# Patient Record
Sex: FEMALE | Race: BLACK OR AFRICAN AMERICAN | NOT HISPANIC OR LATINO | Employment: UNEMPLOYED | URBAN - METROPOLITAN AREA
[De-identification: names, ages, dates, MRNs, and addresses within clinical notes are randomized per-mention and may not be internally consistent; named-entity substitution may affect disease eponyms.]

---

## 2023-11-11 ENCOUNTER — APPOINTMENT (OUTPATIENT)
Dept: GENERAL RADIOLOGY | Facility: CLINIC | Age: 73
End: 2023-11-11
Attending: PHYSICIAN ASSISTANT
Payer: COMMERCIAL

## 2023-11-11 ENCOUNTER — HOSPITAL ENCOUNTER (EMERGENCY)
Facility: CLINIC | Age: 73
Discharge: HOME | End: 2023-11-11
Attending: EMERGENCY MEDICINE | Admitting: EMERGENCY MEDICINE
Payer: COMMERCIAL

## 2023-11-11 VITALS
HEART RATE: 68 BPM | RESPIRATION RATE: 18 BRPM | SYSTOLIC BLOOD PRESSURE: 126 MMHG | OXYGEN SATURATION: 100 % | DIASTOLIC BLOOD PRESSURE: 65 MMHG | TEMPERATURE: 97.9 F

## 2023-11-11 DIAGNOSIS — R09.89 THROAT TIGHTNESS: ICD-10-CM

## 2023-11-11 DIAGNOSIS — K21.9 GASTROESOPHAGEAL REFLUX DISEASE, UNSPECIFIED WHETHER ESOPHAGITIS PRESENT: ICD-10-CM

## 2023-11-11 DIAGNOSIS — S60.011A CONTUSION OF RIGHT THUMB WITHOUT DAMAGE TO NAIL, INITIAL ENCOUNTER: ICD-10-CM

## 2023-11-11 LAB
ALBUMIN SERPL BCG-MCNC: 4 G/DL (ref 3.5–5.2)
ALP SERPL-CCNC: 91 U/L (ref 35–104)
ALT SERPL W P-5'-P-CCNC: 11 U/L (ref 0–50)
ANION GAP SERPL CALCULATED.3IONS-SCNC: 5 MMOL/L (ref 7–15)
AST SERPL W P-5'-P-CCNC: 17 U/L (ref 0–45)
BASOPHILS # BLD AUTO: 0 10E3/UL (ref 0–0.2)
BASOPHILS NFR BLD AUTO: 0 %
BILIRUB SERPL-MCNC: 0.9 MG/DL
BUN SERPL-MCNC: 13.3 MG/DL (ref 8–23)
CALCIUM SERPL-MCNC: 9.6 MG/DL (ref 8.8–10.2)
CHLORIDE SERPL-SCNC: 104 MMOL/L (ref 98–107)
CREAT SERPL-MCNC: 0.87 MG/DL (ref 0.51–0.95)
DEPRECATED HCO3 PLAS-SCNC: 27 MMOL/L (ref 22–29)
EGFRCR SERPLBLD CKD-EPI 2021: 71 ML/MIN/1.73M2
EOSINOPHIL # BLD AUTO: 0.1 10E3/UL (ref 0–0.7)
EOSINOPHIL NFR BLD AUTO: 2 %
ERYTHROCYTE [DISTWIDTH] IN BLOOD BY AUTOMATED COUNT: 12 % (ref 10–15)
GLUCOSE SERPL-MCNC: 105 MG/DL (ref 70–99)
HCT VFR BLD AUTO: 41.6 % (ref 35–47)
HGB BLD-MCNC: 13.5 G/DL (ref 11.7–15.7)
IMM GRANULOCYTES # BLD: 0 10E3/UL
IMM GRANULOCYTES NFR BLD: 0 %
LYMPHOCYTES # BLD AUTO: 1.5 10E3/UL (ref 0.8–5.3)
LYMPHOCYTES NFR BLD AUTO: 29 %
MCH RBC QN AUTO: 28.3 PG (ref 26.5–33)
MCHC RBC AUTO-ENTMCNC: 32.5 G/DL (ref 31.5–36.5)
MCV RBC AUTO: 87 FL (ref 78–100)
MONOCYTES # BLD AUTO: 0.4 10E3/UL (ref 0–1.3)
MONOCYTES NFR BLD AUTO: 8 %
NEUTROPHILS # BLD AUTO: 3 10E3/UL (ref 1.6–8.3)
NEUTROPHILS NFR BLD AUTO: 61 %
NRBC # BLD AUTO: 0 10E3/UL
NRBC BLD AUTO-RTO: 0 /100
PLATELET # BLD AUTO: 228 10E3/UL (ref 150–450)
POTASSIUM SERPL-SCNC: 3.7 MMOL/L (ref 3.4–5.3)
PROT SERPL-MCNC: 6.8 G/DL (ref 6.4–8.3)
RBC # BLD AUTO: 4.77 10E6/UL (ref 3.8–5.2)
SODIUM SERPL-SCNC: 136 MMOL/L (ref 135–145)
TROPONIN T SERPL HS-MCNC: <6 NG/L
TSH SERPL DL<=0.005 MIU/L-ACNC: 1.01 UIU/ML (ref 0.3–4.2)
WBC # BLD AUTO: 5.1 10E3/UL (ref 4–11)

## 2023-11-11 PROCEDURE — 99285 EMERGENCY DEPT VISIT HI MDM: CPT | Mod: 25 | Performed by: EMERGENCY MEDICINE

## 2023-11-11 PROCEDURE — 80053 COMPREHEN METABOLIC PANEL: CPT | Performed by: PHYSICIAN ASSISTANT

## 2023-11-11 PROCEDURE — 99284 EMERGENCY DEPT VISIT MOD MDM: CPT | Mod: 25 | Performed by: EMERGENCY MEDICINE

## 2023-11-11 PROCEDURE — 73140 X-RAY EXAM OF FINGER(S): CPT | Mod: RT

## 2023-11-11 PROCEDURE — 93010 ELECTROCARDIOGRAM REPORT: CPT | Performed by: EMERGENCY MEDICINE

## 2023-11-11 PROCEDURE — 84484 ASSAY OF TROPONIN QUANT: CPT | Performed by: PHYSICIAN ASSISTANT

## 2023-11-11 PROCEDURE — 93005 ELECTROCARDIOGRAM TRACING: CPT | Performed by: EMERGENCY MEDICINE

## 2023-11-11 PROCEDURE — 250N000013 HC RX MED GY IP 250 OP 250 PS 637: Performed by: PHYSICIAN ASSISTANT

## 2023-11-11 PROCEDURE — 71046 X-RAY EXAM CHEST 2 VIEWS: CPT

## 2023-11-11 PROCEDURE — 85025 COMPLETE CBC W/AUTO DIFF WBC: CPT | Performed by: PHYSICIAN ASSISTANT

## 2023-11-11 PROCEDURE — 84443 ASSAY THYROID STIM HORMONE: CPT | Performed by: PHYSICIAN ASSISTANT

## 2023-11-11 PROCEDURE — 36415 COLL VENOUS BLD VENIPUNCTURE: CPT | Performed by: PHYSICIAN ASSISTANT

## 2023-11-11 RX ORDER — MAGNESIUM HYDROXIDE/ALUMINUM HYDROXICE/SIMETHICONE 120; 1200; 1200 MG/30ML; MG/30ML; MG/30ML
15 SUSPENSION ORAL ONCE
Status: COMPLETED | OUTPATIENT
Start: 2023-11-11 | End: 2023-11-11

## 2023-11-11 RX ORDER — SUCRALFATE ORAL 1 G/10ML
1 SUSPENSION ORAL 4 TIMES DAILY
Qty: 414 ML | Refills: 0 | Status: SHIPPED | OUTPATIENT
Start: 2023-11-11

## 2023-11-11 RX ORDER — ACETAMINOPHEN 500 MG
1000 TABLET ORAL ONCE
Status: COMPLETED | OUTPATIENT
Start: 2023-11-11 | End: 2023-11-11

## 2023-11-11 RX ADMIN — ACETAMINOPHEN 1000 MG: 500 TABLET ORAL at 21:08

## 2023-11-11 RX ADMIN — ALUMINUM HYDROXIDE, MAGNESIUM HYDROXIDE, AND SIMETHICONE 15 ML: 200; 200; 20 SUSPENSION ORAL at 21:08

## 2023-11-11 ASSESSMENT — ACTIVITIES OF DAILY LIVING (ADL): ADLS_ACUITY_SCORE: 35

## 2023-11-12 LAB
ATRIAL RATE - MUSE: 69 BPM
DIASTOLIC BLOOD PRESSURE - MUSE: NORMAL MMHG
INTERPRETATION ECG - MUSE: NORMAL
P AXIS - MUSE: 4 DEGREES
PR INTERVAL - MUSE: 166 MS
QRS DURATION - MUSE: 76 MS
QT - MUSE: 420 MS
QTC - MUSE: 450 MS
R AXIS - MUSE: 20 DEGREES
SYSTOLIC BLOOD PRESSURE - MUSE: NORMAL MMHG
T AXIS - MUSE: 36 DEGREES
VENTRICULAR RATE- MUSE: 69 BPM

## 2023-11-12 NOTE — ED PROVIDER NOTES
"ED Provider Note  [facility]      History     Chief Complaint   Patient presents with    Chest Pain     Patient has family member that is interpreting for her (declines medication), she states that she has been taking her heartburn medication, but that the pain is getting worse. Feels tighning in her chest and throat, unclear how long, family member just keeps stating, \"awhile.\"    Patient also has a right thumb injury, slammed it in a car door upon arrival to the ED.      Hand Pain     Right thumb.      HPI  Alex GUTIERREZ Apolinar is a 73 year old female past medical history significant for GERD, H. pylori infection, chronic sinusitis chronic constipation dysphagia, presents the emergency department tonight with her 2 daughters with concerns for neck discomfort.  History obtained through daughters, [place]  offered and declined.    Patient states that she is here today for 2 main concerns, neck discomfort as well as right thumb injury.    Daughter states over the last 8 months patient been dealing with ongoing anterior neck discomfort.  This seems to bother her when she swallows and gives her a pressure type sensation in her neck.  This does not influence with eating.  There is no associated fevers cough runny nose or sore throat with her symptoms.  Patient initially had some chest discomfort when her symptoms started several months ago which has not resolved she does not have any chest pain shortness of breath nausea vomiting diarrhea at this time.  She will be constipation from time to time and does use stool softeners for this.  Patient has been eating and drinking at home.    Patient has been seen several times for dysphagia and neck discomfort including recent visit to the [facility] emergency [id number].  At that time she had reassuring work-up including CT scanning of the neck with contrast showing no acute findings, unchanged multilevel cervical spondylosis, faint " suggestion of focal nodularity in the inferior right parotid gland thought to be normal heterogeneous enhancement of the gland without suspicious adenopathy.  Patient was discharged with recommendations of following up with GI including swallow study.    Patient states she did recently see [facility] in the interim and was put on a new PPI.  She is not currently currently on famotidine.  She has not yet seen anyone for a swallow study.  She is not having any dental pain with her symptoms currently.  In addition patient notes prior to coming into the emergency room she slammed her right thumb into a car door just prior to presentation.    She localizes pain to the distal aspect of the right thumb without any other pain from her injury into the right hand remaining digits.  She reports some numbness here.  Per EMR patient does not recall last tetanus shot, I do not see prior tetanus in the medical record.  Patient is right-hand dominant.    Past Medical History  No past medical history on file.  No past surgical history on file.  sucralfate (CARAFATE) 1 GM/10ML suspension      Allergies   Allergen Reactions    Oxymetazoline Other (See Comments)     Family History  No family history on file.  Social History          A medically appropriate review of systems was performed with pertinent positives and negatives noted in the HPI, and all other systems negative.    Physical Exam   BP: 126/65  Pulse: 68  Temp: 97.9  F (36.6  C)  Resp: 18  SpO2: 100 %  Physical Exam    GENERAL APPEARANCE: The patient is well developed, well appearing, and in no acute distress.  HEAD:  Normocephalic and atraumatic.   EENT: Voice normal.  UV midline with no exits.  No drooling noted.  No muffled voice.  No tenderness to palpation of the teeth throughout.  Patient has no submandibular swelling noted on exam no tenderness here noted.  No TM erythema or bulging bilaterally.  NECK: Trachea is midline.No lymphadenopathy or tenderness.  Patient is  able to fully laterally bend the neck to each side without discomfort.  No neck swelling noted.  LUNGS: Breath sounds are equal and clear bilaterally. No wheezes, rhonchi, or rales.  HEART: Regular rate and normal rhythm.    ABDOMEN: Soft, flat, and benign. No mass, tenderness, guarding, or rebound.Bowel sounds are present.  EXTREMITIES: No cyanosis, clubbing, or edema.  Examination of the right thumb shows superficial abrasion to the pad of the right thumb.  No visible laceration requiring closure.  No visible foreign body bone tendon seen.  No involvement of the nail.  Patient has tenderness to palpation to the distal aspect of the thumb without significant tenderness noted to palpation of the IP first MCP, right wrist snuffbox or any other digits of the right hand.  NEUROLOGIC: No focal sensory or motor deficits are noted.  Gross sensation intact distal aspect right thumb.  Peripheral vascular: Radial pulse 2+ on the right.  Cap refill less than 2 seconds right thumb.  PSYCHIATRIC: The patient is awake, alert.  Appropriate mood and affect.  SKIN: Warm, dry, and well perfused. Good turgor.    ED Course, Procedures, & Data        EKG 11/11/2023:  Normal sinus rhythm, ventricular rate 69 with QTc 452.  On my interpretation there is low voltage present.  There appears to be a P wave before every QRS complex.  I do not see any visible ST elevation or depression to suggest ischemia.  No prior for comparison.     Results for orders placed or performed during the hospital encounter of 11/11/23   XR Finger Right G/E 2 Views     Status: None    Narrative    EXAM: XR FINGER RIGHT G/E 2 VIEWS  LOCATION: [facility]  DATE: 11/11/2023    INDICATION: Right thumb crush injury. Pain. Evaluate for tuft fracture.  COMPARISON: None.      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 Otherwise negative. No  fracture.     Chest XR,  PA & LAT     Status: None    Narrative    EXAM: XR CHEST 2 VIEWS  LOCATION: [facility]  DATE: 11/11/2023    INDICATION: Neck discomfort, r o free air or other etiology  COMPARISON: None.      Impression    IMPRESSION: No acute cardiopulmonary disease. Hypoinflated lungs.   Comprehensive metabolic panel     Status: Abnormal   Result Value Ref Range    Sodium 136 135 - 145 mmol/L    Potassium 3.7 3.4 - 5.3 mmol/L    Carbon Dioxide (CO2) 27 22 - 29 mmol/L    Anion Gap 5 (L) 7 - 15 mmol/L    Urea Nitrogen 13.3 8.0 - 23.0 mg/dL    Creatinine 0.87 0.51 - 0.95 mg/dL    GFR Estimate 71 >60 mL/min/1.73m2    Calcium 9.6 8.8 - 10.2 mg/dL    Chloride 104 98 - 107 mmol/L    Glucose 105 (H) 70 - 99 mg/dL    Alkaline Phosphatase 91 35 - 104 U/L    AST 17 0 - 45 U/L    ALT 11 0 - 50 U/L    Protein Total 6.8 6.4 - 8.3 g/dL    Albumin 4.0 3.5 - 5.2 g/dL    Bilirubin Total 0.9 <=1.2 mg/dL   Troponin T, High Sensitivity     Status: Normal   Result Value Ref Range    Troponin T, High Sensitivity <6 <=14 ng/L   TSH with free T4 reflex     Status: Normal   Result Value Ref Range    TSH 1.01 0.30 - 4.20 uIU/mL   CBC with platelets and differential     Status: None   Result Value Ref Range    WBC Count 5.1 4.0 - 11.0 10e3/uL    RBC Count 4.77 3.80 - 5.20 10e6/uL    Hemoglobin 13.5 11.7 - 15.7 g/dL    Hematocrit 41.6 35.0 - 47.0 %    MCV 87 78 - 100 fL    MCH 28.3 26.5 - 33.0 pg    MCHC 32.5 31.5 - 36.5 g/dL    RDW 12.0 10.0 - 15.0 %    Platelet Count 228 150 - 450 10e3/uL    % Neutrophils 61 %    % Lymphocytes 29 %    % Monocytes 8 %    % Eosinophils 2 %    % Basophils 0 %    % Immature Granulocytes 0 %    NRBCs per 100 WBC 0 <1 /100    Absolute Neutrophils 3.0 1.6 - 8.3 10e3/uL    Absolute Lymphocytes 1.5 0.8 - 5.3 10e3/uL    Absolute Monocytes 0.4 0.0 - 1.3 10e3/uL    Absolute Eosinophils 0.1 0.0 - 0.7 10e3/uL    Absolute Basophils 0.0 0.0 - 0.2 10e3/uL    Absolute  Immature Granulocytes 0.0 <=0.4 10e3/uL    Absolute NRBCs 0.0 10e3/uL   EKG 12 lead     Status: None (Preliminary result)   Result Value Ref Range    Systolic Blood Pressure  mmHg    Diastolic Blood Pressure  mmHg    Ventricular Rate 69 BPM    Atrial Rate 69 BPM    IA Interval 166 ms    QRS Duration 76 ms     ms    QTc 450 ms    P Axis 4 degrees    R AXIS 20 degrees    T Axis 36 degrees    Interpretation ECG       Sinus rhythm  Septal infarct , age undetermined  Abnormal ECG     CBC with platelets differential     Status: None    Narrative    The following orders were created for panel order CBC with platelets differential.  Procedure                               Abnormality         Status                     ---------                               -----------         ------                     CBC with platelets and d...[[id number]]                      Final result                 Please view results for these tests on the individual orders.     Medications   alum & mag hydroxide-simethicone (MAALOX) suspension 15 mL (15 mLs Oral $Given 11/11/23 2108)   Tdap (tetanus-diphtheria-acell pertussis) (ADACEL) injection 0.5 mL (0.5 mLs Intramuscular Not Given 11/11/23 2112)   acetaminophen (TYLENOL) tablet 1,000 mg (1,000 mg Oral $Given 11/11/23 2108)     Labs Ordered and Resulted from Time of ED Arrival to Time of ED Departure   COMPREHENSIVE METABOLIC PANEL - Abnormal       Result Value    Sodium 136      Potassium 3.7      Carbon Dioxide (CO2) 27      Anion Gap 5 (*)     Urea Nitrogen 13.3      Creatinine 0.87      GFR Estimate 71      Calcium 9.6      Chloride 104      Glucose 105 (*)     Alkaline Phosphatase 91      AST 17      ALT 11      Protein Total 6.8      Albumin 4.0      Bilirubin Total 0.9     TROPONIN T, HIGH SENSITIVITY - Normal    Troponin T, High Sensitivity <6     TSH WITH FREE T4 REFLEX - Normal    TSH 1.01     CBC WITH PLATELETS AND DIFFERENTIAL    WBC Count 5.1      RBC Count 4.77       Hemoglobin 13.5      Hematocrit 41.6      MCV 87      MCH 28.3      MCHC 32.5      RDW 12.0      Platelet Count 228      % Neutrophils 61      % Lymphocytes 29      % Monocytes 8      % Eosinophils 2      % Basophils 0      % Immature Granulocytes 0      NRBCs per 100 WBC 0      Absolute Neutrophils 3.0      Absolute Lymphocytes 1.5      Absolute Monocytes 0.4      Absolute Eosinophils 0.1      Absolute Basophils 0.0      Absolute Immature Granulocytes 0.0      Absolute NRBCs 0.0       Chest XR,  PA & LAT   Final Result   IMPRESSION: No acute cardiopulmonary disease. Hypoinflated lungs.      XR Finger Right G/E 2 Views   Final Result   IMPRESSION: Mild osteoarthrosis of the first CMC joint, first MCP joint and the interphalangeal joint of the thumb. Otherwise negative. No fracture.                Critical care was not performed.     Medical Decision Making  The patient's presentation was of high complexity (a chronic illness severe exacerbation, progression, or side effect of treatment).    The patient's evaluation involved:  ordering and/or review of 3+ test(s) in this encounter (see separate area of note for details)  independent interpretation of testing performed by another health professional (CXR)    The patient's management necessitated moderate risk (prescription drug management including medications given in the ED).    Assessment & Plan    This is a 73-year-old female with known history of dysphagia present with concerns for ongoing dysphagia symptoms over the last 8 months in the absence of any new symptoms, recent negative CT neck 1 month ago.  On presentation to department vital signs within normal range.  Examination of the neck shows no obvious swelling, drooling, change in voice.  Patient has no visible otitis media to explain neck discomfort, she has reassuring tonsils on exam without exudates, swelling, drooling, or other emergent findings suggestive of PTA RPA or deep space infection.  She is not  having any posterior neck pain to suggest cervical etiology.  Duration of symptoms not consistent with carotid dissection or other emergent vascular process.  Patient not having any chest pain or shortness of breath here.  Initial differential considered includes possibility of motility issue, GERD, amongst others.  Low suspicion of deep space infection with recent negative CT and clinical findings here.  Will repeat troponin EKG, TSH will be obtained as patient has not had this since symptom onset in addition to screening labs CBC chemistry.  At this time I do not see indication to repeat any imaging of the neck specifically.  I will obtain chest x-ray to evaluate for possibility of soft tissue gas in the neck, or other emergent chest pathology.    In regards to hand injury patient has superficial abrasion to the right thumb with associated tenderness.  She has good cap refill and good sensation in the digit.  Will obtain radiograph to evaluate for possibility of tuft fracture.  Tetanus will be updated as I see no prior records in the chart.  Independent interpretation of finger x-ray shows no visible fracture and this was discussed with patient.  Area was irrigated by nursing staff bacitracin applied along with nonstick dressing.  Discussed with patient recommendations of keeping area clean covered with expectations of the wound to heal well within the next week.    EKG my interpretation shows no findings suggestive of ischemia or other arrhythmia.  CBC without leukocytosis or anemia.  Chemistry shows normal electrolytes, LFTs and creatinine.  Troponin within reference range less than 6 with reassuring EKG findings not suggestive of ischemia.  TSH was also obtained reviewed and is normal at 1.01.  Chest x-ray independently interpreted by myself, negative for obvious consolidation pneumothorax or other emergent pathology on my interpretation radiologist overread shows no acute cardiopulmonary process with  hypoinflated lungs.    On reevaluation patient states she feels improved after Tylenol and GI cocktail.  Discussed with her suspicion at this is likely ongoing GERD versus other throat etiology.  Patient states she has done well in the past with sucralfate, will give a prescription to take home.  She was encouraged to continue taking the rapid resolved 20 mg twice daily that she had been prescribed by GI.  She was encouraged to call her GI specialist.  I will place referral to follow-up with ENT as she has not had a formal ENT evaluation to evaluate her throat discomfort.    Patient has no other questions or concerns at this time.  Red flag signs were addressed, and they were in agreement with the patient care plan provided.    Patient seen and discussed with attending physician , who agrees with my plan of care.    I have reviewed the nursing notes. I have reviewed the findings, diagnosis, plan and need for follow up with the patient.    New Prescriptions    SUCRALFATE (CARAFATE) 1 GM/10ML SUSPENSION    Take 10 mLs (1 g) by mouth 4 times daily       Final diagnoses:   Contusion of right thumb without damage to nail, initial encounter   Throat tightness   Gastroesophageal reflux disease, unspecified whether esophagitis present       LINDA Murrieta  [facility] EMERGENCY DEPARTMENT  11/11/2023    --    ED Attending Physician Attestation    I Garrick Duran MD, cared for this patient with the Advanced Practice Provider (KELLI). I have performed a history and physical examination of the patient independent of the KELLI. I reviewed the KELLI's documentation above and agree with the documented findings and plan of care. I personally provided a substantive portion of the care for this patient, including the complete Medical Decision Making. Please see the KELLI's documentation for full details.    Summary of HPI, PE, ED Course   Patient is a 73 year old female evaluated in the emergency department for  throat discomfort and right thumb injury. Exam and ED course notable for NAD. After the completion of care in the emergency department, the patient was discharged.    Critical Care & Procedures  Not applicable.        Medical Decision Making  The patient's presentation was of moderate complexity (a chronic illness mild to moderate exacerbation, progression, or side effect of treatment).    The patient's evaluation involved:  ordering and/or review of 3+ test(s) in this encounter (see separate area of note for details)    The patient's management necessitated moderate risk (prescription drug management including medications given in the ED).          Garrick Duran MD  Emergency Medicine       Garrick Duran MD  11/12/23 9387     0 = independent

## 2023-11-12 NOTE — ED TRIAGE NOTES
"Patient has family member that is interpreting for her (declines medication), she states that she has been taking her heartburn medication, but that the pain is getting worse. Feels tighning in her chest and throat, unclear how long, family member just keeps stating, \"awhile.\"    Patient also has a right thumb injury, slammed it in a car door upon arrival to the ED.     Triage Assessment (Adult)       Row Name 11/11/23 5953          Triage Assessment    Airway WDL WDL        Respiratory WDL    Respiratory WDL WDL        Skin Circulation/Temperature WDL    Skin Circulation/Temperature WDL WDL        Cardiac WDL    Cardiac WDL X;chest pain        Chest Pain Assessment    Chest Pain Location midsternal     Character tightness        Peripheral/Neurovascular WDL    Peripheral Neurovascular WDL WDL        Cognitive/Neuro/Behavioral WDL    Cognitive/Neuro/Behavioral WDL WDL                     "

## 2023-11-12 NOTE — DISCHARGE INSTRUCTIONS
Here in the emergency room you have a reassuring evaluation.  Your heart enzyme, basic lab work, EKG and chest x-ray were all reassuring.  Your symptoms are consistent with acid reflux.  Recommend continuing the PPI medicine that you have been taking.  I did prescribe you sucralfate which you can use as needed.  Recommend avoiding acidic foods spicy foods and alcohol.  I recommend reaching out to [facility] to schedule follow-up visit.  I also placed a referral to ear nose and throat as well for follow-up to ensure that you are not having difficulties from your throat.    We obtained an x-ray of your finger, right thumb which is negative and shows no broken bone.  Recommend keeping area clean with triple antibiotic ointment, nonstick dressing.  This should heal well within the next week.    If you develop any new or worsening symptoms, is important to return right away to the emergency department for further evaluation and management.

## 2024-12-28 ENCOUNTER — HOSPITAL ENCOUNTER (EMERGENCY)
Facility: CLINIC | Age: 74
Discharge: HOME | End: 2024-12-29
Attending: EMERGENCY MEDICINE | Admitting: EMERGENCY MEDICINE
Payer: COMMERCIAL

## 2024-12-28 DIAGNOSIS — R10.13 EPIGASTRIC PAIN: ICD-10-CM

## 2024-12-28 DIAGNOSIS — K80.20 SYMPTOMATIC CHOLELITHIASIS: ICD-10-CM

## 2024-12-28 PROCEDURE — 99285 EMERGENCY DEPT VISIT HI MDM: CPT | Performed by: EMERGENCY MEDICINE

## 2024-12-28 PROCEDURE — 93010 ELECTROCARDIOGRAM REPORT: CPT | Performed by: EMERGENCY MEDICINE

## 2024-12-28 PROCEDURE — 99285 EMERGENCY DEPT VISIT HI MDM: CPT | Mod: 25 | Performed by: EMERGENCY MEDICINE

## 2024-12-28 PROCEDURE — 93005 ELECTROCARDIOGRAM TRACING: CPT | Performed by: EMERGENCY MEDICINE

## 2024-12-28 ASSESSMENT — COLUMBIA-SUICIDE SEVERITY RATING SCALE - C-SSRS
1. IN THE PAST MONTH, HAVE YOU WISHED YOU WERE DEAD OR WISHED YOU COULD GO TO SLEEP AND NOT WAKE UP?: NO
2. HAVE YOU ACTUALLY HAD ANY THOUGHTS OF KILLING YOURSELF IN THE PAST MONTH?: NO
6. HAVE YOU EVER DONE ANYTHING, STARTED TO DO ANYTHING, OR PREPARED TO DO ANYTHING TO END YOUR LIFE?: NO

## 2024-12-29 ENCOUNTER — APPOINTMENT (OUTPATIENT)
Dept: ULTRASOUND IMAGING | Facility: CLINIC | Age: 74
End: 2024-12-29
Attending: EMERGENCY MEDICINE
Payer: COMMERCIAL

## 2024-12-29 ENCOUNTER — APPOINTMENT (OUTPATIENT)
Dept: GENERAL RADIOLOGY | Facility: CLINIC | Age: 74
End: 2024-12-29
Attending: EMERGENCY MEDICINE
Payer: COMMERCIAL

## 2024-12-29 VITALS
DIASTOLIC BLOOD PRESSURE: 60 MMHG | HEART RATE: 67 BPM | SYSTOLIC BLOOD PRESSURE: 112 MMHG | TEMPERATURE: 97.7 F | OXYGEN SATURATION: 100 % | RESPIRATION RATE: 16 BRPM

## 2024-12-29 PROBLEM — A04.8 HELICOBACTER PYLORI INFECTION: Status: ACTIVE | Noted: 2023-02-09

## 2024-12-29 PROBLEM — R32 URINARY INCONTINENCE: Status: ACTIVE | Noted: 2023-02-09

## 2024-12-29 PROBLEM — H53.8 BLURRING OF VISUAL IMAGE: Status: ACTIVE | Noted: 2018-07-02

## 2024-12-29 PROBLEM — J32.9 CHRONIC SINUSITIS: Status: ACTIVE | Noted: 2023-03-24

## 2024-12-29 PROBLEM — R26.9 ABNORMAL GAIT: Status: ACTIVE | Noted: 2023-03-24

## 2024-12-29 PROBLEM — R26.2 DISABILITY OF WALKING: Status: ACTIVE | Noted: 2023-03-24

## 2024-12-29 PROBLEM — K59.04 CHRONIC IDIOPATHIC CONSTIPATION: Status: ACTIVE | Noted: 2023-03-24

## 2024-12-29 PROBLEM — M54.50 LOW BACK PAIN: Status: ACTIVE | Noted: 2023-03-24

## 2024-12-29 PROBLEM — J30.1 ALLERGIC RHINITIS DUE TO POLLEN: Status: ACTIVE | Noted: 2023-03-24

## 2024-12-29 LAB
ALBUMIN SERPL BCG-MCNC: 4.2 G/DL (ref 3.5–5.2)
ALBUMIN UR-MCNC: NEGATIVE MG/DL
ALP SERPL-CCNC: 77 U/L (ref 40–150)
ALT SERPL W P-5'-P-CCNC: 12 U/L (ref 0–50)
ANION GAP SERPL CALCULATED.3IONS-SCNC: 10 MMOL/L (ref 7–15)
APPEARANCE UR: CLEAR
AST SERPL W P-5'-P-CCNC: 20 U/L (ref 0–45)
ATRIAL RATE - MUSE: 67 BPM
BACTERIA #/AREA URNS HPF: ABNORMAL /HPF
BASOPHILS # BLD AUTO: 0 10E3/UL (ref 0–0.2)
BASOPHILS NFR BLD AUTO: 1 %
BILIRUB SERPL-MCNC: 1.2 MG/DL
BILIRUB UR QL STRIP: NEGATIVE
BUN SERPL-MCNC: 6.9 MG/DL (ref 8–23)
CALCIUM SERPL-MCNC: 9.8 MG/DL (ref 8.8–10.4)
CHLORIDE SERPL-SCNC: 102 MMOL/L (ref 98–107)
COLOR UR AUTO: ABNORMAL
CREAT SERPL-MCNC: 0.81 MG/DL (ref 0.51–0.95)
DIASTOLIC BLOOD PRESSURE - MUSE: NORMAL MMHG
EGFRCR SERPLBLD CKD-EPI 2021: 77 ML/MIN/1.73M2
EOSINOPHIL # BLD AUTO: 0.1 10E3/UL (ref 0–0.7)
EOSINOPHIL NFR BLD AUTO: 2 %
ERYTHROCYTE [DISTWIDTH] IN BLOOD BY AUTOMATED COUNT: 12.1 % (ref 10–15)
FLUAV RNA SPEC QL NAA+PROBE: NEGATIVE
FLUBV RNA RESP QL NAA+PROBE: NEGATIVE
GLUCOSE SERPL-MCNC: 101 MG/DL (ref 70–99)
GLUCOSE UR STRIP-MCNC: NEGATIVE MG/DL
HCO3 SERPL-SCNC: 23 MMOL/L (ref 22–29)
HCT VFR BLD AUTO: 40.1 % (ref 35–47)
HGB BLD-MCNC: 14 G/DL (ref 11.7–15.7)
HGB UR QL STRIP: NEGATIVE
IMM GRANULOCYTES # BLD: 0 10E3/UL
IMM GRANULOCYTES NFR BLD: 0 %
INTERPRETATION ECG - MUSE: NORMAL
KETONES UR STRIP-MCNC: NEGATIVE MG/DL
LEUKOCYTE ESTERASE UR QL STRIP: NEGATIVE
LIPASE SERPL-CCNC: 29 U/L (ref 13–60)
LYMPHOCYTES # BLD AUTO: 1.7 10E3/UL (ref 0.8–5.3)
LYMPHOCYTES NFR BLD AUTO: 40 %
MCH RBC QN AUTO: 29 PG (ref 26.5–33)
MCHC RBC AUTO-ENTMCNC: 34.9 G/DL (ref 31.5–36.5)
MCV RBC AUTO: 83 FL (ref 78–100)
MONOCYTES # BLD AUTO: 0.4 10E3/UL (ref 0–1.3)
MONOCYTES NFR BLD AUTO: 10 %
NEUTROPHILS # BLD AUTO: 2.1 10E3/UL (ref 1.6–8.3)
NEUTROPHILS NFR BLD AUTO: 48 %
NITRATE UR QL: NEGATIVE
NRBC # BLD AUTO: 0 10E3/UL
NRBC BLD AUTO-RTO: 0 /100
P AXIS - MUSE: 54 DEGREES
PH UR STRIP: 5.5 [PH] (ref 5–7)
PLATELET # BLD AUTO: 232 10E3/UL (ref 150–450)
POTASSIUM SERPL-SCNC: 4.1 MMOL/L (ref 3.4–5.3)
PR INTERVAL - MUSE: 144 MS
PROT SERPL-MCNC: 7 G/DL (ref 6.4–8.3)
QRS DURATION - MUSE: 76 MS
QT - MUSE: 420 MS
QTC - MUSE: 443 MS
R AXIS - MUSE: 21 DEGREES
RBC # BLD AUTO: 4.82 10E6/UL (ref 3.8–5.2)
RBC URINE: 0 /HPF
RSV RNA SPEC NAA+PROBE: NEGATIVE
SARS-COV-2 RNA RESP QL NAA+PROBE: NEGATIVE
SODIUM SERPL-SCNC: 135 MMOL/L (ref 135–145)
SP GR UR STRIP: 1 (ref 1–1.03)
SYSTOLIC BLOOD PRESSURE - MUSE: NORMAL MMHG
T AXIS - MUSE: 67 DEGREES
TROPONIN T SERPL HS-MCNC: <6 NG/L
UROBILINOGEN UR STRIP-MCNC: NORMAL MG/DL
VENTRICULAR RATE- MUSE: 67 BPM
WBC # BLD AUTO: 4.3 10E3/UL (ref 4–11)
WBC URINE: <1 /HPF

## 2024-12-29 PROCEDURE — 80051 ELECTROLYTE PANEL: CPT | Performed by: EMERGENCY MEDICINE

## 2024-12-29 PROCEDURE — 85025 COMPLETE CBC W/AUTO DIFF WBC: CPT | Performed by: EMERGENCY MEDICINE

## 2024-12-29 PROCEDURE — 87637 SARSCOV2&INF A&B&RSV AMP PRB: CPT | Performed by: EMERGENCY MEDICINE

## 2024-12-29 PROCEDURE — 76705 ECHO EXAM OF ABDOMEN: CPT | Mod: 26 | Performed by: RADIOLOGY

## 2024-12-29 PROCEDURE — 76705 ECHO EXAM OF ABDOMEN: CPT

## 2024-12-29 PROCEDURE — 81001 URINALYSIS AUTO W/SCOPE: CPT | Performed by: EMERGENCY MEDICINE

## 2024-12-29 PROCEDURE — 84460 ALANINE AMINO (ALT) (SGPT): CPT | Performed by: EMERGENCY MEDICINE

## 2024-12-29 PROCEDURE — 250N000009 HC RX 250: Performed by: EMERGENCY MEDICINE

## 2024-12-29 PROCEDURE — 250N000013 HC RX MED GY IP 250 OP 250 PS 637: Performed by: EMERGENCY MEDICINE

## 2024-12-29 PROCEDURE — 36415 COLL VENOUS BLD VENIPUNCTURE: CPT | Performed by: EMERGENCY MEDICINE

## 2024-12-29 PROCEDURE — 84484 ASSAY OF TROPONIN QUANT: CPT | Performed by: EMERGENCY MEDICINE

## 2024-12-29 PROCEDURE — 71046 X-RAY EXAM CHEST 2 VIEWS: CPT | Mod: 26 | Performed by: RADIOLOGY

## 2024-12-29 PROCEDURE — 83690 ASSAY OF LIPASE: CPT | Performed by: EMERGENCY MEDICINE

## 2024-12-29 PROCEDURE — 71046 X-RAY EXAM CHEST 2 VIEWS: CPT

## 2024-12-29 RX ORDER — SUCRALFATE 1 G/1
1 TABLET ORAL ONCE
Status: COMPLETED | OUTPATIENT
Start: 2024-12-29 | End: 2024-12-29

## 2024-12-29 RX ORDER — OXYCODONE HYDROCHLORIDE 5 MG/1
5 TABLET ORAL ONCE
Status: COMPLETED | OUTPATIENT
Start: 2024-12-29 | End: 2024-12-29

## 2024-12-29 RX ORDER — PANTOPRAZOLE SODIUM 40 MG/1
40 TABLET, DELAYED RELEASE ORAL DAILY
Qty: 30 TABLET | Refills: 0 | Status: SHIPPED | OUTPATIENT
Start: 2024-12-29 | End: 2025-01-28

## 2024-12-29 RX ORDER — MAGNESIUM HYDROXIDE/ALUMINUM HYDROXICE/SIMETHICONE 120; 1200; 1200 MG/30ML; MG/30ML; MG/30ML
15 SUSPENSION ORAL ONCE
Status: COMPLETED | OUTPATIENT
Start: 2024-12-29 | End: 2024-12-29

## 2024-12-29 RX ORDER — CYCLOBENZAPRINE HCL 5 MG
10 TABLET ORAL ONCE
Status: COMPLETED | OUTPATIENT
Start: 2024-12-29 | End: 2024-12-29

## 2024-12-29 RX ORDER — LIDOCAINE HYDROCHLORIDE 20 MG/ML
10 SOLUTION OROPHARYNGEAL ONCE
Status: COMPLETED | OUTPATIENT
Start: 2024-12-29 | End: 2024-12-29

## 2024-12-29 RX ORDER — ONDANSETRON 2 MG/ML
4 INJECTION INTRAMUSCULAR; INTRAVENOUS EVERY 30 MIN PRN
Status: DISCONTINUED | OUTPATIENT
Start: 2024-12-29 | End: 2024-12-29 | Stop reason: HOSPADM

## 2024-12-29 RX ADMIN — ALUMINUM HYDROXIDE, MAGNESIUM HYDROXIDE, AND SIMETHICONE 15 ML: 1200; 120; 1200 SUSPENSION ORAL at 01:52

## 2024-12-29 RX ADMIN — CYCLOBENZAPRINE HYDROCHLORIDE 10 MG: 5 TABLET, FILM COATED ORAL at 01:52

## 2024-12-29 RX ADMIN — LIDOCAINE HYDROCHLORIDE 10 ML: 20 SOLUTION ORAL at 01:56

## 2024-12-29 RX ADMIN — SUCRALFATE 1 G: 1 TABLET ORAL at 01:52

## 2024-12-29 ASSESSMENT — ACTIVITIES OF DAILY LIVING (ADL)
ADLS_ACUITY_SCORE: 43
ADLS_ACUITY_SCORE: 43
ADLS_ACUITY_SCORE: 41
ADLS_ACUITY_SCORE: 43
ADLS_ACUITY_SCORE: 41
ADLS_ACUITY_SCORE: 43

## 2024-12-29 NOTE — DISCHARGE INSTRUCTIONS
Please make an appointment to follow up with [facility](phone: [phone]) in 7-14 days.    You were given a referral to Surgery clinic. Someone should call you to set up this appointment, but please call the number listed above, if you do not hear from someone within 1-2 business days.     You may continue taking the Mylanta, Carafate, and stool softener at home to treat symptoms.    Instead of taking the Nexium/omeprazole you should start taking Protonix.  This is the same type of medication but sometimes can be more effective at treating acid reflux.    Read the handout to review foods to avoid when you have acid reflux symptoms.      Return to the ED for worsening pain, recurrent vomiting or diarrhea, blood in our stool, dehydration, fever, or abdominal distention and inability to pass gas from below.

## 2024-12-29 NOTE — ED TRIAGE NOTES
Patient arrives to triage with family in wheelchair c/o heartburn X 3 weeks with abdominal pain and vomiting.      Triage Assessment (Adult)       Row Name 12/28/24 5154          Triage Assessment    Airway WDL WDL        Respiratory WDL    Respiratory WDL WDL        Skin Circulation/Temperature WDL    Skin Circulation/Temperature WDL WDL        Cardiac WDL    Cardiac WDL WDL        Peripheral/Neurovascular WDL    Peripheral Neurovascular WDL WDL        Cognitive/Neuro/Behavioral WDL    Cognitive/Neuro/Behavioral WDL WDL

## 2024-12-29 NOTE — ED PROVIDER NOTES
[place] [facility] DE[facility]MENT ([facility])    12/28/24       ED PROVIDER NOTE    History     Chief Complaint   Patient presents with    Gastrophageal Reflux     HPI  Alex Jiang is a 74 year old female with a past medical history of GERD, venous thrombosis, H pylori infection, chronic constipation, dysuria, blurred vision, and sinusitis, who presents to the ED for evaluation of reflux The patient states that she has had an increasing burning sensations in her chest, upper abdomin and back for the past few weeks.  She has taken Omeprazole, Carafate and Mylanta with no relief.  She has taken Omeprazole for 3 months and Carafate and Mylanta for the past 2 days. She has had constipation but was naldo to make a small bowel movement this morning. She does not feel and urgency to make a bowel movement. She has taken linzess for her constipation. She did vomit once today and one last week. She does have chronic urian incontinence. She adds that she treated and tested negative for H pylori in 2023. Her last EDG was also in 2023. She denies any blood in stool or pain with urination.     Past Medical History  History reviewed. No pertinent past medical history.  No past surgical history on file.  pantoprazole (PROTONIX) 40 MG EC tablet  sucralfate (CARAFATE) 1 GM/10ML suspension      Allergies   Allergen Reactions    Oxymetazoline Other (See Comments)     Family History  No family history on file.  Social History       A medically appropriate review of systems was performed with pertinent positives and negatives noted in the HPI, and all other systems negative.    Physical Exam   BP: 139/72  Pulse: 66  Temp: 97.6  F (36.4  C)  Resp: 18  SpO2: 100 %  Physical Exam  Vitals and nursing note reviewed.   Constitutional:       General: She is not in acute distress.     Appearance: Normal appearance. She is well-developed and normal weight. She is not ill-appearing or diaphoretic.   HENT:      Head: Normocephalic and  atraumatic.      Nose: Nose normal.      Mouth/Throat:      Mouth: Mucous membranes are moist.   Eyes:      General: No scleral icterus.     Conjunctiva/sclera: Conjunctivae normal.   Cardiovascular:      Rate and Rhythm: Normal rate.      Heart sounds: Normal heart sounds.   Pulmonary:      Effort: Pulmonary effort is normal. No respiratory distress.      Breath sounds: Normal breath sounds. No stridor.   Abdominal:      General: Abdomen is flat. There is no distension.      Palpations: Abdomen is soft.      Tenderness: There is abdominal tenderness in the epigastric area. There is guarding. There is no rebound. Negative signs include Cai's sign.       Musculoskeletal:         General: No deformity or signs of injury. Normal range of motion.      Cervical back: Normal range of motion and neck supple. No rigidity.   Skin:     General: Skin is warm and dry.      Coloration: Skin is not jaundiced or pale.      Findings: No rash.   Neurological:      General: No focal deficit present.      Mental Status: She is alert and oriented to person, place, and time.   Psychiatric:         Mood and Affect: Mood normal.         Behavior: Behavior normal.           ED Course, Procedures, & Data      Procedures            EKG Interpretation:      Interpreted by Teena Perkins  Time reviewed: 00:28  Symptoms at time of EKG: chest pain   Rhythm: normal sinus   Rate: normal  Axis: normal  Ectopy: none  Conduction: normal  ST Segments/ T Waves: No ST-T wave changes  Q Waves: none  Comparison to prior: No old EKG available    Clinical Impression: normal EKG            Results for orders placed or performed during the hospital encounter of 12/28/24   XR Chest 2 Views     Status: None    Narrative    EXAM: XR CHEST 2 VIEWS  LOCATION: [facility]  DATE: 12/29/2024    INDICATION: chest pain  COMPARISON: 11/11/2023      Impression    IMPRESSION: Negative chest.   US Abdomen Limited (RUQ)      Status: None    Narrative    EXAM: US ABDOMEN LIMITED  LOCATION: [facility]  DATE: 12/29/2024    INDICATION: eipgastric burning, chest burning, back  COMPARISON: None.  TECHNIQUE: Limited abdominal ultrasound.    FINDINGS:    GALLBLADDER: Cholelithiasis with nonmobile gallstone neck of the gallbladder. No visible wall thickening. Sonographic Cai's sign negative.    BILE DUCTS: No biliary dilatation. The common duct measures 3 mm.    LIVER: Grossly within normal limits where seen.    RIGHT KIDNEY: No hydronephrosis.    PANCREAS: Partial obscuration by bowel gas.    No visible ascites.      Impression    IMPRESSION:  1.  Cholelithiasis with nonmobile stone neck of the gallbladder.  2.  No biliary dilatation. Sonographic Cai's sign negative.       Comprehensive metabolic panel     Status: Abnormal   Result Value Ref Range    Sodium 135 135 - 145 mmol/L    Potassium 4.1 3.4 - 5.3 mmol/L    Carbon Dioxide (CO2) 23 22 - 29 mmol/L    Anion Gap 10 7 - 15 mmol/L    Urea Nitrogen 6.9 (L) 8.0 - 23.0 mg/dL    Creatinine 0.81 0.51 - 0.95 mg/dL    GFR Estimate 77 >60 mL/min/1.73m2    Calcium 9.8 8.8 - 10.4 mg/dL    Chloride 102 98 - 107 mmol/L    Glucose 101 (H) 70 - 99 mg/dL    Alkaline Phosphatase 77 40 - 150 U/L    AST 20 0 - 45 U/L    ALT 12 0 - 50 U/L    Protein Total 7.0 6.4 - 8.3 g/dL    Albumin 4.2 3.5 - 5.2 g/dL    Bilirubin Total 1.2 <=1.2 mg/dL   Lipase     Status: Normal   Result Value Ref Range    Lipase 29 13 - 60 U/L   Troponin T, High Sensitivity     Status: Normal   Result Value Ref Range    Troponin T, High Sensitivity <6 <=14 ng/L   UA with Microscopic reflex to Culture     Status: Abnormal    Specimen: Urine, Clean Catch   Result Value Ref Range    Color Urine Straw Colorless, Straw, Light Yellow, Yellow    Appearance Urine Clear Clear    Glucose Urine Negative Negative mg/dL    Bilirubin Urine Negative Negative    Ketones Urine Negative Negative mg/dL     Specific Gravity Urine 1.002 (L) 1.003 - 1.035    Blood Urine Negative Negative    pH Urine 5.5 5.0 - 7.0    Protein Albumin Urine Negative Negative mg/dL    Urobilinogen Urine Normal Normal, 2.0 mg/dL    Nitrite Urine Negative Negative    Leukocyte Esterase Urine Negative Negative    Bacteria Urine Few (A) None Seen /HPF    RBC Urine 0 <=2 /HPF    WBC Urine <1 <=5 /HPF    Narrative    Urine Culture not indicated   Influenza A/B, RSV and SARS-CoV2 PCR (COVID-19) Nasopharyngeal     Status: Normal    Specimen: Nasopharyngeal; Swab   Result Value Ref Range    Influenza A PCR Negative Negative    Influenza B PCR Negative Negative    RSV PCR Negative Negative    SARS CoV2 PCR Negative Negative    Narrative    Testing was performed using the Xpert Xpress CoV2/Flu/RSV Assay on the [employer] Instrument. This test should be ordered for the detection of SARS-CoV2, influenza, and RSV viruses in individuals with signs and symptoms of respiratory tract infection. This test is for in vitro diagnostic use under the [place] FDA for laboratories certified under CLIA to perform high or moderate complexity testing. This test has been [place] FDA cleared. A negative result does not rule out the presence of PCR inhibitors in the specimen or target RNA in concentration below the limit of detection for the assay. If only one viral target is positive but coinfection with multiple targets is suspected, the sample should be re-tested with another FDA cleared, approved, or authorized test, if coninfection would change clinical management. This test was validated by the [facility]. These laboratories are certified under the Clinical Laboratory Improvement Amendments of 1988 (CLIA-88) as qualified to perfom high complexity laboratory testing.   CBC with platelets and differential     Status: None   Result Value Ref Range    WBC Count 4.3 4.0 - 11.0 10e3/uL    RBC Count 4.82 3.80 - 5.20 10e6/uL    Hemoglobin 14.0 11.7 - 15.7  g/dL    Hematocrit 40.1 35.0 - 47.0 %    MCV 83 78 - 100 fL    MCH 29.0 26.5 - 33.0 pg    MCHC 34.9 31.5 - 36.5 g/dL    RDW 12.1 10.0 - 15.0 %    Platelet Count 232 150 - 450 10e3/uL    % Neutrophils 48 %    % Lymphocytes 40 %    % Monocytes 10 %    % Eosinophils 2 %    % Basophils 1 %    % Immature Granulocytes 0 %    NRBCs per 100 WBC 0 <1 /100    Absolute Neutrophils 2.1 1.6 - 8.3 10e3/uL    Absolute Lymphocytes 1.7 0.8 - 5.3 10e3/uL    Absolute Monocytes 0.4 0.0 - 1.3 10e3/uL    Absolute Eosinophils 0.1 0.0 - 0.7 10e3/uL    Absolute Basophils 0.0 0.0 - 0.2 10e3/uL    Absolute Immature Granulocytes 0.0 <=0.4 10e3/uL    Absolute NRBCs 0.0 10e3/uL   EKG 12-lead, tracing only     Status: None   Result Value Ref Range    Systolic Blood Pressure  mmHg    Diastolic Blood Pressure  mmHg    Ventricular Rate 67 BPM    Atrial Rate 67 BPM    IL Interval 144 ms    QRS Duration 76 ms     ms    QTc 443 ms    P Axis 54 degrees    R AXIS 21 degrees    T Axis 67 degrees    Interpretation ECG       Sinus rhythm  Normal ECG    Unconfirmed report - interpretation of this ECG is computer generated - see medical record for final interpretation  Confirmed by - EMERGENCY ROOM, PHYSICIAN ([id number]),  SHANDA TINSLEY ([id number]) on 12/29/2024 8:36:14 AM     CBC with platelets differential     Status: None    Narrative    The following orders were created for panel order CBC with platelets differential.  Procedure                               Abnormality         Status                     ---------                               -----------         ------                     CBC with platelets and d...[[id number]]                      Final result                 Please view results for these tests on the individual orders.     Medications   alum & mag hydroxide-simethicone (MAALOX) suspension 15 mL (15 mLs Oral $Given 12/29/24 0152)   lidocaine (viscous) (XYLOCAINE) 2 % solution 10 mL (10 mLs Mouth/Throat $Given 12/29/24 8816)    sucralfate (CARAFATE) tablet 1 g (1 g Oral $Given 12/29/24 0152)   cyclobenzaprine (FLEXERIL) tablet 10 mg (10 mg Oral $Given 12/29/24 0152)   oxyCODONE (ROXICODONE) tablet 5 mg (5 mg Oral Not Given 12/29/24 1370)     Labs Ordered and Resulted from Time of ED Arrival to Time of ED Departure   COMPREHENSIVE METABOLIC PANEL - Abnormal       Result Value    Sodium 135      Potassium 4.1      Carbon Dioxide (CO2) 23      Anion Gap 10      Urea Nitrogen 6.9 (*)     Creatinine 0.81      GFR Estimate 77      Calcium 9.8      Chloride 102      Glucose 101 (*)     Alkaline Phosphatase 77      AST 20      ALT 12      Protein Total 7.0      Albumin 4.2      Bilirubin Total 1.2     ROUTINE UA WITH MICROSCOPIC REFLEX TO CULTURE - Abnormal    Color Urine Straw      Appearance Urine Clear      Glucose Urine Negative      Bilirubin Urine Negative      Ketones Urine Negative      Specific Gravity Urine 1.002 (*)     Blood Urine Negative      pH Urine 5.5      Protein Albumin Urine Negative      Urobilinogen Urine Normal      Nitrite Urine Negative      Leukocyte Esterase Urine Negative      Bacteria Urine Few (*)     RBC Urine 0      WBC Urine <1     LIPASE - Normal    Lipase 29     TROPONIN T, HIGH SENSITIVITY - Normal    Troponin T, High Sensitivity <6     INFLUENZA A/B, RSV AND SARS-COV2 PCR - Normal    Influenza A PCR Negative      Influenza B PCR Negative      RSV PCR Negative      SARS CoV2 PCR Negative     CBC WITH PLATELETS AND DIFFERENTIAL    WBC Count 4.3      RBC Count 4.82      Hemoglobin 14.0      Hematocrit 40.1      MCV 83      MCH 29.0      MCHC 34.9      RDW 12.1      Platelet Count 232      % Neutrophils 48      % Lymphocytes 40      % Monocytes 10      % Eosinophils 2      % Basophils 1      % Immature Granulocytes 0      NRBCs per 100 WBC 0      Absolute Neutrophils 2.1      Absolute Lymphocytes 1.7      Absolute Monocytes 0.4      Absolute Eosinophils 0.1      Absolute Basophils 0.0      Absolute Immature  Granulocytes 0.0      Absolute NRBCs 0.0       US Abdomen Limited (RUQ)   Final Result   IMPRESSION:   1.  Cholelithiasis with nonmobile stone neck of the gallbladder.   2.  No biliary dilatation. Sonographic Cai's sign negative.            XR Chest 2 Views   Final Result   IMPRESSION: Negative chest.                 Assessment & Plan    Alex Jiang is a 74 year old female with a past medical history of GERD, venous thrombosis, H pylori infection, chronic constipation, dysuria, blurred vision, and sinusitis, who presents to the ED for evaluation of reflux.    Ddx: PUD, h pylori recurrence, GERD/gastritis, msk back pain, acute coronary syndrome    Patient given a GI cocktail with lidocaine, Carafate tab, Flexeril, for pain.  Noted improvement.  CBC normal.  Viral studies negative.  Troponin negative.  Lipase normal.  CMP normal.  Urinalysis bland.  EKG with sinus rhythm and no acute ischemic changes.  Chest x-ray clear.  Abdominal ultrasound shows cholelithiasis with a nonmobile stone at the neck of the gallbladder but no biliary dilation.  Surgery consulted for evaluation of possible symptomatic cholelithiasis.  They evaluated the patient and offered admission for elective cholecystectomy but the patient preferred to follow-up in surgery clinic as an outpatient.  She was given oxycodone 1 time prior to discharge for recurrent pain.    Referred to general surgery clinic for follow-up within 1 to 2 weeks.  Discussed with family on symptomatic management at home.  Patient is already taking Nexium, Carafate, and Maalox.  They would like to switch her Nexium to Protonix to see if this is more effective.  Prescription ordered.      Detailed return precautions provided.      I have reviewed the nursing notes. I have reviewed the findings, diagnosis, plan and need for follow up with the patient.    Discharge Medication List as of 12/29/2024  6:01 AM        START taking these medications    Details   pantoprazole  (PROTONIX) 40 MG EC tablet Take 1 tablet (40 mg) by mouth daily., Disp-30 tablet, R-0, Local Print             Final diagnoses:   Symptomatic cholelithiasis   Epigastric pain   IMarguerite, am serving as a trained medical scribe to document services personally performed by Teena Perkins MD, based on the provider's statements to me.     ITeena MD, was physically present and have reviewed and verified the accuracy of this note documented by Marguerite Lawrence.     Teena Perkins MD  [facility] EMERGENCY DEPARTMENT  12/28/2024     Teena Perkins MD  12/31/24 0020

## 2024-12-29 NOTE — ED NOTES
Patient discharge home, accompanied by daughters, ambulatory and vitally stable   Pt presents c/o productive cough with yellow mucous and diaphoresis for a month. Denies any sore throat.

## 2024-12-29 NOTE — CONSULTS
[facility]    Consult Note - General Surgery Service  Date of Admission:  12/28/2024  Consult Requested by: ED, Dr. Perkins  Reason for Consult: Symptomatic cholelithiasis     Assessment & Plan: Surgery   Alex Jiang is a 74 year old female with a history of GERD, H. Pylori who presented to the ED on 12/28 due to several weeks of burning in her chest, upper abdomen and back found to have nonmobile stone in the neck of the gallbladder. Symptoms possibly secondary to symptomatic cholelithiasis, no signs of cholecystitis on labs, imaging or exam.    Discussed possibility of admission with cholecystectomy versus discharging home and following up outpatient to discuss elective surgery. Patient preference is to go home today and follow up in clinic. We discussed that she should return to the ED if she develops fevers, abdominal pain or is unable to tolerate a diet. Will schedule clinic appointment.      The patient's care was discussed with the Chief Resident/Fellow, Dr. Montez and staff, Dr. Clifton.     Luann Millan,   General Surgery Resident   ___________________________________    Chief Complaint   Abdominal pain, nausea, vomiting    History is obtained from the patient    History of Present Illness   Alex Jiang is a 74 year old female with a history of GERD, H. Pylori who presented to the ED on 12/28 due to burning in her chest, upper abdomen and back for several weeks, fairly constant but changes in severity. Mostly burning and no actual abdominal pain. She has taken omeprazole, carafate and mylanta without improvement, which is unusual for her. She was treated for H. Pylori in 2023 and tested negative after treatment. She has not been nauseated or vomiting, but has been eating less for the past couple days, as food makes the burning worse.     In the ED she is afebrile with HR 70s and SBP 120s. CBC, CMP, lipase, troponin unremarkable. RUQ US  demonstrated cholelithiasis with nonmobile gallstone in the neck of the gallbladder. No signs of cholecystitis or choledocholithiasis.       Past Medical History    GERD  H. Pylori    Past Surgical History   None    Prior to Admission Medications   I have reviewed this patient's current medications         Physical Exam   Vital Signs: Temp: 97.8  F (36.6  C) Temp src: Oral BP: 127/78 Pulse: 70   Resp: 18 SpO2: 100 % O2 Device: None (Room air)    Weight: 0 lbs 0 oz  Intake/Output Summary (Last 24 hours) at 12/29/2024 0437  Last data filed at 12/29/2024 0244  Gross per 24 hour   Intake 0 ml   Output --   Net 0 ml     General Appearance: Appears comfortable  Respiratory: unlabored breathing on room air  Cardiovascular: regular rate  GI: soft, non-tender, non-distended      Data     I have personally reviewed the following data over the past 24 hrs:    4.3  \   14.0   / 232     135 102 6.9 (L) /  101 (H)   4.1 23 0.81 \     ALT: 12 AST: 20 AP: 77 TBILI: 1.2   ALB: 4.2 TOT PROTEIN: 7.0 LIPASE: 29     Trop: <6 BNP: N/A

## 2024-12-30 ENCOUNTER — PATIENT OUTREACH (OUTPATIENT)
Dept: SURGERY | Facility: CLINIC | Age: 74
End: 2024-12-30
Payer: COMMERCIAL

## 2024-12-30 NOTE — PROGRESS NOTES
Spoke with the patient with the assistance of a Polish  to help arrange an outpatient clinic consult with General Surgery to discuss gallbladder surgery. Patient would like to call back when her children are home. She was provided with the scheduling line number.